# Patient Record
Sex: MALE | Race: WHITE | NOT HISPANIC OR LATINO | Employment: FULL TIME | URBAN - METROPOLITAN AREA
[De-identification: names, ages, dates, MRNs, and addresses within clinical notes are randomized per-mention and may not be internally consistent; named-entity substitution may affect disease eponyms.]

---

## 2019-11-08 ENCOUNTER — HOSPITAL ENCOUNTER (INPATIENT)
Facility: MEDICAL CENTER | Age: 39
LOS: 1 days | DRG: 897 | End: 2019-11-09
Attending: EMERGENCY MEDICINE | Admitting: INTERNAL MEDICINE

## 2019-11-08 DIAGNOSIS — F10.10 ALCOHOL ABUSE: ICD-10-CM

## 2019-11-08 DIAGNOSIS — R26.81 GAIT INSTABILITY: ICD-10-CM

## 2019-11-08 LAB
ALBUMIN SERPL BCP-MCNC: 4.4 G/DL (ref 3.2–4.9)
ALBUMIN/GLOB SERPL: 2.1 G/DL
ALP SERPL-CCNC: 89 U/L (ref 30–99)
ALT SERPL-CCNC: 14 U/L (ref 2–50)
AMPHET UR QL SCN: NEGATIVE
ANION GAP SERPL CALC-SCNC: 7 MMOL/L (ref 0–11.9)
APPEARANCE UR: CLEAR
AST SERPL-CCNC: 15 U/L (ref 12–45)
BARBITURATES UR QL SCN: NEGATIVE
BASOPHILS # BLD AUTO: 0.6 % (ref 0–1.8)
BASOPHILS # BLD: 0.05 K/UL (ref 0–0.12)
BENZODIAZ UR QL SCN: NEGATIVE
BILIRUB SERPL-MCNC: 0.4 MG/DL (ref 0.1–1.5)
BILIRUB UR QL STRIP.AUTO: NEGATIVE
BUN SERPL-MCNC: 10 MG/DL (ref 8–22)
BZE UR QL SCN: NEGATIVE
CALCIUM SERPL-MCNC: 9.5 MG/DL (ref 8.5–10.5)
CANNABINOIDS UR QL SCN: POSITIVE
CHLORIDE SERPL-SCNC: 109 MMOL/L (ref 96–112)
CO2 SERPL-SCNC: 23 MMOL/L (ref 20–33)
COLOR UR: YELLOW
CREAT SERPL-MCNC: 0.85 MG/DL (ref 0.5–1.4)
EOSINOPHIL # BLD AUTO: 0.2 K/UL (ref 0–0.51)
EOSINOPHIL NFR BLD: 2.3 % (ref 0–6.9)
ERYTHROCYTE [DISTWIDTH] IN BLOOD BY AUTOMATED COUNT: 43.1 FL (ref 35.9–50)
GLOBULIN SER CALC-MCNC: 2.1 G/DL (ref 1.9–3.5)
GLUCOSE SERPL-MCNC: 90 MG/DL (ref 65–99)
GLUCOSE UR STRIP.AUTO-MCNC: NEGATIVE MG/DL
HCT VFR BLD AUTO: 51.7 % (ref 42–52)
HGB BLD-MCNC: 17.1 G/DL (ref 14–18)
IMM GRANULOCYTES # BLD AUTO: 0.02 K/UL (ref 0–0.11)
IMM GRANULOCYTES NFR BLD AUTO: 0.2 % (ref 0–0.9)
INR PPP: 0.98 (ref 0.87–1.13)
KETONES UR STRIP.AUTO-MCNC: NEGATIVE MG/DL
LEUKOCYTE ESTERASE UR QL STRIP.AUTO: NEGATIVE
LYMPHOCYTES # BLD AUTO: 3.23 K/UL (ref 1–4.8)
LYMPHOCYTES NFR BLD: 36.5 % (ref 22–41)
MAGNESIUM SERPL-MCNC: 2.1 MG/DL (ref 1.5–2.5)
MCH RBC QN AUTO: 30.4 PG (ref 27–33)
MCHC RBC AUTO-ENTMCNC: 33.1 G/DL (ref 33.7–35.3)
MCV RBC AUTO: 91.8 FL (ref 81.4–97.8)
METHADONE UR QL SCN: NEGATIVE
MICRO URNS: NORMAL
MONOCYTES # BLD AUTO: 0.87 K/UL (ref 0–0.85)
MONOCYTES NFR BLD AUTO: 9.8 % (ref 0–13.4)
NEUTROPHILS # BLD AUTO: 4.48 K/UL (ref 1.82–7.42)
NEUTROPHILS NFR BLD: 50.6 % (ref 44–72)
NITRITE UR QL STRIP.AUTO: NEGATIVE
NRBC # BLD AUTO: 0 K/UL
NRBC BLD-RTO: 0 /100 WBC
OPIATES UR QL SCN: NEGATIVE
OXYCODONE UR QL SCN: NEGATIVE
PCP UR QL SCN: NEGATIVE
PH UR STRIP.AUTO: 5 [PH] (ref 5–8)
PLATELET # BLD AUTO: 185 K/UL (ref 164–446)
PMV BLD AUTO: 11.2 FL (ref 9–12.9)
POTASSIUM SERPL-SCNC: 3.6 MMOL/L (ref 3.6–5.5)
PROPOXYPH UR QL SCN: NEGATIVE
PROT SERPL-MCNC: 6.5 G/DL (ref 6–8.2)
PROT UR QL STRIP: NEGATIVE MG/DL
PROTHROMBIN TIME: 13.2 SEC (ref 12–14.6)
RBC # BLD AUTO: 5.63 M/UL (ref 4.7–6.1)
RBC UR QL AUTO: NEGATIVE
SODIUM SERPL-SCNC: 139 MMOL/L (ref 135–145)
SP GR UR STRIP.AUTO: 1.02
UROBILINOGEN UR STRIP.AUTO-MCNC: 0.2 MG/DL
WBC # BLD AUTO: 8.9 K/UL (ref 4.8–10.8)

## 2019-11-08 PROCEDURE — A9270 NON-COVERED ITEM OR SERVICE: HCPCS | Performed by: EMERGENCY MEDICINE

## 2019-11-08 PROCEDURE — 700101 HCHG RX REV CODE 250: Performed by: EMERGENCY MEDICINE

## 2019-11-08 PROCEDURE — 93005 ELECTROCARDIOGRAM TRACING: CPT | Performed by: EMERGENCY MEDICINE

## 2019-11-08 PROCEDURE — 36415 COLL VENOUS BLD VENIPUNCTURE: CPT

## 2019-11-08 PROCEDURE — 96365 THER/PROPH/DIAG IV INF INIT: CPT

## 2019-11-08 PROCEDURE — 96368 THER/DIAG CONCURRENT INF: CPT

## 2019-11-08 PROCEDURE — 700102 HCHG RX REV CODE 250 W/ 637 OVERRIDE(OP): Performed by: EMERGENCY MEDICINE

## 2019-11-08 PROCEDURE — 85025 COMPLETE CBC W/AUTO DIFF WBC: CPT

## 2019-11-08 PROCEDURE — 99285 EMERGENCY DEPT VISIT HI MDM: CPT

## 2019-11-08 PROCEDURE — 96366 THER/PROPH/DIAG IV INF ADDON: CPT

## 2019-11-08 PROCEDURE — 700105 HCHG RX REV CODE 258: Performed by: EMERGENCY MEDICINE

## 2019-11-08 PROCEDURE — 700111 HCHG RX REV CODE 636 W/ 250 OVERRIDE (IP): Performed by: EMERGENCY MEDICINE

## 2019-11-08 PROCEDURE — 83735 ASSAY OF MAGNESIUM: CPT

## 2019-11-08 PROCEDURE — 81003 URINALYSIS AUTO W/O SCOPE: CPT

## 2019-11-08 PROCEDURE — 770020 HCHG ROOM/CARE - TELE (206)

## 2019-11-08 PROCEDURE — 80053 COMPREHEN METABOLIC PANEL: CPT

## 2019-11-08 PROCEDURE — 85610 PROTHROMBIN TIME: CPT

## 2019-11-08 PROCEDURE — 80307 DRUG TEST PRSMV CHEM ANLYZR: CPT

## 2019-11-08 RX ORDER — LORAZEPAM 1 MG/1
1 TABLET ORAL EVERY 4 HOURS PRN
COMMUNITY

## 2019-11-08 RX ORDER — POLYETHYLENE GLYCOL 3350 17 G/17G
1 POWDER, FOR SOLUTION ORAL
Status: DISCONTINUED | OUTPATIENT
Start: 2019-11-08 | End: 2019-11-09

## 2019-11-08 RX ORDER — DIMENHYDRINATE 50 MG
50 TABLET ORAL EVERY 6 HOURS PRN
COMMUNITY

## 2019-11-08 RX ORDER — BISACODYL 10 MG
10 SUPPOSITORY, RECTAL RECTAL
Status: DISCONTINUED | OUTPATIENT
Start: 2019-11-08 | End: 2019-11-09

## 2019-11-08 RX ORDER — ACETAMINOPHEN 325 MG/1
650 TABLET ORAL EVERY 6 HOURS PRN
Status: DISCONTINUED | OUTPATIENT
Start: 2019-11-08 | End: 2019-11-09 | Stop reason: HOSPADM

## 2019-11-08 RX ORDER — AMOXICILLIN 250 MG
2 CAPSULE ORAL 2 TIMES DAILY
Status: DISCONTINUED | OUTPATIENT
Start: 2019-11-08 | End: 2019-11-09

## 2019-11-08 RX ADMIN — THIAMINE HYDROCHLORIDE 500 MG: 100 INJECTION, SOLUTION INTRAMUSCULAR; INTRAVENOUS at 21:53

## 2019-11-08 RX ADMIN — THIAMINE HYDROCHLORIDE 1000 ML: 100 INJECTION, SOLUTION INTRAMUSCULAR; INTRAVENOUS at 21:37

## 2019-11-08 RX ADMIN — THERA TABS 1 TABLET: TAB at 21:37

## 2019-11-08 ASSESSMENT — PATIENT HEALTH QUESTIONNAIRE - PHQ9
1. LITTLE INTEREST OR PLEASURE IN DOING THINGS: NOT AT ALL
2. FEELING DOWN, DEPRESSED, IRRITABLE, OR HOPELESS: NOT AT ALL
SUM OF ALL RESPONSES TO PHQ9 QUESTIONS 1 AND 2: 0

## 2019-11-09 ENCOUNTER — APPOINTMENT (OUTPATIENT)
Dept: RADIOLOGY | Facility: MEDICAL CENTER | Age: 39
DRG: 897 | End: 2019-11-09
Attending: STUDENT IN AN ORGANIZED HEALTH CARE EDUCATION/TRAINING PROGRAM

## 2019-11-09 VITALS
WEIGHT: 143.3 LBS | OXYGEN SATURATION: 93 % | RESPIRATION RATE: 16 BRPM | DIASTOLIC BLOOD PRESSURE: 82 MMHG | HEART RATE: 72 BPM | HEIGHT: 70 IN | BODY MASS INDEX: 20.52 KG/M2 | TEMPERATURE: 98.6 F | SYSTOLIC BLOOD PRESSURE: 121 MMHG

## 2019-11-09 PROBLEM — R26.81 GAIT INSTABILITY: Status: ACTIVE | Noted: 2019-11-09

## 2019-11-09 PROBLEM — F10.10 ALCOHOL ABUSE: Status: ACTIVE | Noted: 2019-11-09

## 2019-11-09 PROBLEM — E87.6 HYPOKALEMIA: Status: ACTIVE | Noted: 2019-11-09

## 2019-11-09 LAB
ALBUMIN SERPL BCP-MCNC: 4 G/DL (ref 3.2–4.9)
ALBUMIN/GLOB SERPL: 2.4 G/DL
ALP SERPL-CCNC: 71 U/L (ref 30–99)
ALT SERPL-CCNC: 10 U/L (ref 2–50)
ANION GAP SERPL CALC-SCNC: 7 MMOL/L (ref 0–11.9)
AST SERPL-CCNC: 12 U/L (ref 12–45)
BASOPHILS # BLD AUTO: 0.6 % (ref 0–1.8)
BASOPHILS # BLD: 0.05 K/UL (ref 0–0.12)
BILIRUB SERPL-MCNC: 0.3 MG/DL (ref 0.1–1.5)
BUN SERPL-MCNC: 8 MG/DL (ref 8–22)
CALCIUM SERPL-MCNC: 8.9 MG/DL (ref 8.5–10.5)
CHLORIDE SERPL-SCNC: 109 MMOL/L (ref 96–112)
CO2 SERPL-SCNC: 25 MMOL/L (ref 20–33)
CREAT SERPL-MCNC: 0.9 MG/DL (ref 0.5–1.4)
EOSINOPHIL # BLD AUTO: 0.23 K/UL (ref 0–0.51)
EOSINOPHIL NFR BLD: 2.9 % (ref 0–6.9)
ERYTHROCYTE [DISTWIDTH] IN BLOOD BY AUTOMATED COUNT: 43.7 FL (ref 35.9–50)
FOLATE SERPL-MCNC: >22.4 NG/ML
GLOBULIN SER CALC-MCNC: 1.7 G/DL (ref 1.9–3.5)
GLUCOSE SERPL-MCNC: 80 MG/DL (ref 65–99)
HCT VFR BLD AUTO: 47.6 % (ref 42–52)
HGB BLD-MCNC: 15.7 G/DL (ref 14–18)
HIV 1+2 AB+HIV1 P24 AG SERPL QL IA: NON REACTIVE
IMM GRANULOCYTES # BLD AUTO: 0.03 K/UL (ref 0–0.11)
IMM GRANULOCYTES NFR BLD AUTO: 0.4 % (ref 0–0.9)
LYMPHOCYTES # BLD AUTO: 3.13 K/UL (ref 1–4.8)
LYMPHOCYTES NFR BLD: 38.8 % (ref 22–41)
MCH RBC QN AUTO: 30.7 PG (ref 27–33)
MCHC RBC AUTO-ENTMCNC: 33 G/DL (ref 33.7–35.3)
MCV RBC AUTO: 93 FL (ref 81.4–97.8)
MONOCYTES # BLD AUTO: 0.85 K/UL (ref 0–0.85)
MONOCYTES NFR BLD AUTO: 10.5 % (ref 0–13.4)
NEUTROPHILS # BLD AUTO: 3.77 K/UL (ref 1.82–7.42)
NEUTROPHILS NFR BLD: 46.8 % (ref 44–72)
NRBC # BLD AUTO: 0 K/UL
NRBC BLD-RTO: 0 /100 WBC
PLATELET # BLD AUTO: 165 K/UL (ref 164–446)
PMV BLD AUTO: 11.2 FL (ref 9–12.9)
POTASSIUM SERPL-SCNC: 3.6 MMOL/L (ref 3.6–5.5)
PROT SERPL-MCNC: 5.7 G/DL (ref 6–8.2)
RBC # BLD AUTO: 5.12 M/UL (ref 4.7–6.1)
SODIUM SERPL-SCNC: 141 MMOL/L (ref 135–145)
T3FREE SERPL-MCNC: 2.93 PG/ML (ref 2.4–4.2)
T4 FREE SERPL-MCNC: 1.01 NG/DL (ref 0.53–1.43)
TREPONEMA PALLIDUM IGG+IGM AB [PRESENCE] IN SERUM OR PLASMA BY IMMUNOASSAY: NON REACTIVE
TSH SERPL DL<=0.005 MIU/L-ACNC: 5.69 UIU/ML (ref 0.38–5.33)
VIT B12 SERPL-MCNC: 517 PG/ML (ref 211–911)
WBC # BLD AUTO: 8.1 K/UL (ref 4.8–10.8)

## 2019-11-09 PROCEDURE — 97535 SELF CARE MNGMENT TRAINING: CPT

## 2019-11-09 PROCEDURE — 86780 TREPONEMA PALLIDUM: CPT

## 2019-11-09 PROCEDURE — 700102 HCHG RX REV CODE 250 W/ 637 OVERRIDE(OP): Performed by: STUDENT IN AN ORGANIZED HEALTH CARE EDUCATION/TRAINING PROGRAM

## 2019-11-09 PROCEDURE — A9270 NON-COVERED ITEM OR SERVICE: HCPCS | Performed by: STUDENT IN AN ORGANIZED HEALTH CARE EDUCATION/TRAINING PROGRAM

## 2019-11-09 PROCEDURE — 70450 CT HEAD/BRAIN W/O DYE: CPT

## 2019-11-09 PROCEDURE — 84481 FREE ASSAY (FT-3): CPT

## 2019-11-09 PROCEDURE — 87389 HIV-1 AG W/HIV-1&-2 AB AG IA: CPT

## 2019-11-09 PROCEDURE — 99223 1ST HOSP IP/OBS HIGH 75: CPT | Mod: GC | Performed by: INTERNAL MEDICINE

## 2019-11-09 PROCEDURE — 700105 HCHG RX REV CODE 258: Performed by: STUDENT IN AN ORGANIZED HEALTH CARE EDUCATION/TRAINING PROGRAM

## 2019-11-09 PROCEDURE — 82607 VITAMIN B-12: CPT

## 2019-11-09 PROCEDURE — 97162 PT EVAL MOD COMPLEX 30 MIN: CPT

## 2019-11-09 PROCEDURE — 36415 COLL VENOUS BLD VENIPUNCTURE: CPT

## 2019-11-09 PROCEDURE — 84439 ASSAY OF FREE THYROXINE: CPT

## 2019-11-09 PROCEDURE — 700111 HCHG RX REV CODE 636 W/ 250 OVERRIDE (IP): Performed by: STUDENT IN AN ORGANIZED HEALTH CARE EDUCATION/TRAINING PROGRAM

## 2019-11-09 PROCEDURE — 84425 ASSAY OF VITAMIN B-1: CPT

## 2019-11-09 PROCEDURE — 82746 ASSAY OF FOLIC ACID SERUM: CPT

## 2019-11-09 PROCEDURE — 97165 OT EVAL LOW COMPLEX 30 MIN: CPT

## 2019-11-09 PROCEDURE — 84443 ASSAY THYROID STIM HORMONE: CPT

## 2019-11-09 PROCEDURE — 85025 COMPLETE CBC W/AUTO DIFF WBC: CPT

## 2019-11-09 PROCEDURE — 80053 COMPREHEN METABOLIC PANEL: CPT

## 2019-11-09 RX ORDER — POLYETHYLENE GLYCOL 3350 17 G/17G
1 POWDER, FOR SOLUTION ORAL
Status: DISCONTINUED | OUTPATIENT
Start: 2019-11-09 | End: 2019-11-09 | Stop reason: HOSPADM

## 2019-11-09 RX ORDER — AMOXICILLIN 250 MG
2 CAPSULE ORAL 2 TIMES DAILY PRN
Status: DISCONTINUED | OUTPATIENT
Start: 2019-11-09 | End: 2019-11-09 | Stop reason: HOSPADM

## 2019-11-09 RX ORDER — M-VIT,TX,IRON,MINS/CALC/FOLIC 27MG-0.4MG
1 TABLET ORAL DAILY
Status: DISCONTINUED | OUTPATIENT
Start: 2019-11-09 | End: 2019-11-09 | Stop reason: HOSPADM

## 2019-11-09 RX ORDER — POTASSIUM CHLORIDE 20 MEQ/1
40 TABLET, EXTENDED RELEASE ORAL ONCE
Status: COMPLETED | OUTPATIENT
Start: 2019-11-09 | End: 2019-11-09

## 2019-11-09 RX ORDER — M-VIT,TX,IRON,MINS/CALC/FOLIC 27MG-0.4MG
1 TABLET ORAL DAILY
Qty: 30 TAB | Refills: 0 | Status: SHIPPED | OUTPATIENT
Start: 2019-11-10

## 2019-11-09 RX ORDER — BISACODYL 10 MG
10 SUPPOSITORY, RECTAL RECTAL
Status: DISCONTINUED | OUTPATIENT
Start: 2019-11-09 | End: 2019-11-09 | Stop reason: HOSPADM

## 2019-11-09 RX ORDER — LANOLIN ALCOHOL/MO/W.PET/CERES
100 CREAM (GRAM) TOPICAL DAILY
Qty: 30 TAB | Refills: 0 | Status: SHIPPED | OUTPATIENT
Start: 2019-11-09

## 2019-11-09 RX ADMIN — POTASSIUM CHLORIDE 40 MEQ: 20 TABLET, EXTENDED RELEASE ORAL at 01:45

## 2019-11-09 RX ADMIN — POTASSIUM CHLORIDE 40 MEQ: 1500 TABLET, EXTENDED RELEASE ORAL at 10:17

## 2019-11-09 RX ADMIN — MULTIPLE VITAMINS W/ MINERALS TAB 1 TABLET: TAB at 12:15

## 2019-11-09 RX ADMIN — THIAMINE HYDROCHLORIDE 500 MG: 100 INJECTION, SOLUTION INTRAMUSCULAR; INTRAVENOUS at 07:50

## 2019-11-09 ASSESSMENT — COGNITIVE AND FUNCTIONAL STATUS - GENERAL
SUGGESTED CMS G CODE MODIFIER DAILY ACTIVITY: CH
MOBILITY SCORE: 24
SUGGESTED CMS G CODE MODIFIER DAILY ACTIVITY: CH
DAILY ACTIVITIY SCORE: 24
DAILY ACTIVITIY SCORE: 24
SUGGESTED CMS G CODE MODIFIER MOBILITY: CH
SUGGESTED CMS G CODE MODIFIER MOBILITY: CH
MOBILITY SCORE: 24

## 2019-11-09 ASSESSMENT — LIFESTYLE VARIABLES
DOES PATIENT WANT TO STOP DRINKING: YES
DOES PATIENT WANT TO TALK TO SOMEONE ABOUT QUITTING: NO
EVER HAD A DRINK FIRST THING IN THE MORNING TO STEADY YOUR NERVES TO GET RID OF A HANGOVER: NO
HAVE PEOPLE ANNOYED YOU BY CRITICIZING YOUR DRINKING: NO
ON A TYPICAL DAY WHEN YOU DRINK ALCOHOL HOW MANY DRINKS DO YOU HAVE: 3
CONSUMPTION TOTAL: POSITIVE
HOW MANY TIMES IN THE PAST YEAR HAVE YOU HAD 5 OR MORE DRINKS IN A DAY: 0
EVER_SMOKED: YES
TOTAL SCORE: 1
ALCOHOL_USE: YES
TOTAL SCORE: 1
AVERAGE NUMBER OF DAYS PER WEEK YOU HAVE A DRINK CONTAINING ALCOHOL: 5
EVER FELT BAD OR GUILTY ABOUT YOUR DRINKING: NO
HAVE YOU EVER FELT YOU SHOULD CUT DOWN ON YOUR DRINKING: YES
TOTAL SCORE: 1

## 2019-11-09 ASSESSMENT — ENCOUNTER SYMPTOMS
CHILLS: 0
SEIZURES: 0
ORTHOPNEA: 0
FOCAL WEAKNESS: 0
COUGH: 0
LOSS OF CONSCIOUSNESS: 0
SENSORY CHANGE: 0
ABDOMINAL PAIN: 0
SPEECH CHANGE: 0
DOUBLE VISION: 0
NAUSEA: 0
TREMORS: 0
BLURRED VISION: 0
MYALGIAS: 0
VOMITING: 0
FEVER: 0
HEMOPTYSIS: 0
TINGLING: 0
WEAKNESS: 0
DIZZINESS: 1
HEARTBURN: 0
DIARRHEA: 0
HEADACHES: 0
PALPITATIONS: 0
CONSTIPATION: 0
WEIGHT LOSS: 0
SPUTUM PRODUCTION: 0

## 2019-11-09 ASSESSMENT — GAIT ASSESSMENTS
GAIT LEVEL OF ASSIST: SUPERVISED
DISTANCE (FEET): 500

## 2019-11-09 ASSESSMENT — ACTIVITIES OF DAILY LIVING (ADL): TOILETING: INDEPENDENT

## 2019-11-09 NOTE — NON-PROVIDER
Internal Medicine Medical Student Note  Note Author: Jair Gonzalez, Student    Name Tim Cutler     1980   Age/Sex 38 y.o. male   MRN [id number]   Code Status Full Code             Reason for interval visit  (Principal Problem)     Ataxia, unsteadiness possibly 2/2 to alcohol withdrawl       Interval Problem Daily Status Update  (problem status, last 24 hours, new history, new data )     Mr. Tim Mclain is a 38 year old male who was admitted for loss of balance, vs. Ataxia vs. Wernicke's. Patient has a history of alcohol abuse and states that he can drink a bottle of whiskey (1/5) in a couple days. Currently Patient denies tremors, delirium, and hallucinations. Patient states that his last drink was on , and states that he drank 5-6 shots of alcohol, though is uncertain of the amount. Patient attributes his ataxia/unsteadiness to a fall about 3 weeks ago where he had exhibited seizure like activity and hit head.     Patient was given thiamine overnight for suspicion of wernicke's and states that he does not feel unsteady on his feet or when he sits up. He feels much better this morning and had no acute events overnight.         Physical Exam       Vitals:    19 0508 19 0810 19 0811 19 0813   BP: 110/70 134/86 131/95 133/98   Pulse: 76 82 (!) 113 (!) 107   Resp: 16 15     Temp: 36.7 °C (98 °F) 37.2 °C (98.9 °F)     TempSrc: Temporal Temporal     SpO2: 94% 95% 95% 95%   Weight:       Height:         Body mass index is 20.56 kg/m². Weight: 65 kg (143 lb 4.8 oz)  Oxygen Therapy:  Pulse Oximetry: 95 %, O2 (LPM): 0, O2 Delivery: None (Room Air)    Physical Exam   Constitutional: He is oriented to person, place, and time and well-developed, well-nourished, and in no distress.   HENT:   Head: Normocephalic and atraumatic.   Neck: Normal range of motion. Neck supple.   Cardiovascular: Normal rate.   Distant heart sounds     Pulmonary/Chest: Effort normal and breath sounds  normal. No respiratory distress. He has no wheezes. He has no rales.   Abdominal: Soft. Bowel sounds are normal. He exhibits no distension. There is no tenderness. There is no rebound and no guarding.   Musculoskeletal: Normal range of motion.   Neurological: He is alert and oriented to person, place, and time.   Skin: Skin is warm and dry.     Recent Labs     11/08/19 2130 11/09/19  0233   WBC 8.9 8.1   RBC 5.63 5.12   HEMOGLOBIN 17.1 15.7   HEMATOCRIT 51.7 47.6   MCV 91.8 93.0   MCH 30.4 30.7   RDW 43.1 43.7   PLATELETCT 185 165   MPV 11.2 11.2   NEUTSPOLYS 50.60 46.80   LYMPHOCYTES 36.50 38.80   MONOCYTES 9.80 10.50   EOSINOPHILS 2.30 2.90   BASOPHILS 0.60 0.60     Recent Labs     11/08/19 2130 11/09/19  0233   SODIUM 139 141   POTASSIUM 3.6 3.6   CHLORIDE 109 109   CO2 23 25   GLUCOSE 90 80   BUN 10 8       CT-HEAD W/O    (Results Pending)           Assessment/Plan     Mr. Tim Mclain is a 38 year old male who was admitted for loss of balance, vs. Ataxia vs. Wernicke's.    #Gait instability possibly 2/2 to alcohol abuse    Patient's long standing history of alcohol abuse has me concerned that he may have developed wernicke's korsakoff. His history of ataxia may suggest this disorder. Patient states that his feelings of unsteadiness and ataxia has improved after the administration of thiamine which may indicate that patient was developing wernicke or placebo effect. Patient admits to drinking alcohol heavily, though has desires to quit.   Plan:   -patients TSH was mildly elevated, reflex T4 will be ordered   -B12 and folate were normal   -CT scan pending   -HIV, RPR will be ordered  -PT/OT may be consulted if patient's ataxia does not improve  -monitor alcohol withdrawals symptoms     #Hypokalemia    Patient had hypokalemia upon evaluation with CMP.   Plan:   -Replace potassium, monitor potassium with morning CMP goal >4

## 2019-11-09 NOTE — DISCHARGE INSTRUCTIONS
Discharge Instructions    Discharged to home by car with relative. Discharged via walking, hospital escort: Refused.  Special equipment needed: Not Applicable    Be sure to schedule a follow-up appointment with your primary care doctor or any specialists as instructed.     Discharge Plan:   Smoking Cessation Offered: Patient Refused  Influenza Vaccine Indication: Patient Refuses    I understand that a diet low in cholesterol, fat, and sodium is recommended for good health. Unless I have been given specific instructions below for another diet, I accept this instruction as my diet prescription.   Other diet: Regular    Special Instructions: None    · Is patient discharged on Warfarin / Coumadin?   No     Depression / Suicide Risk    As you are discharged from this [facility] facility, it is important to learn how to keep safe from harming yourself.    Recognize the warning signs:  · Abrupt changes in personality, positive or negative- including increase in energy   · Giving away possessions  · Change in eating patterns- significant weight changes-  positive or negative  · Change in sleeping patterns- unable to sleep or sleeping all the time   · Unwillingness or inability to communicate  · Depression  · Unusual sadness, discouragement and loneliness  · Talk of wanting to die  · Neglect of personal appearance   · Rebelliousness- reckless behavior  · Withdrawal from people/activities they love  · Confusion- inability to concentrate     If you or a loved one observes any of these behaviors or has concerns about self-harm, here's what you can do:  · Talk about it- your feelings and reasons for harming yourself  · Remove any means that you might use to hurt yourself (examples: pills, rope, extension cords, firearm)  · Get professional help from the community (Mental Health, Substance Abuse, psychological counseling)  · Do not be alone:Call your Safe Contact- someone whom you trust who will be there for you.  · Call your  local CRISIS HOTLINE 606-9223 or 320-175-1048  · Call your local Children's Mobile Crisis Response Team [place] (341) 915-1644 or www.MyPerfectGift.com  · Call the toll free National Suicide Prevention Hotlines   · National Suicide Prevention Lifeline 737-274-NJXJ (1609)  · National Hope Line Network 800-SUICIDE (920-1152)      Dizziness  Dizziness is a common problem. It makes you feel unsteady or lightheaded. You may feel like you are about to pass out (faint). Dizziness can lead to injury if you stumble or fall. Anyone can get dizzy, but dizziness is more common in older adults. This condition can be caused by a number of things, including:  · Medicines.  · Dehydration.  · Illness.  Follow these instructions at home:  Following these instructions may help with your condition:  Eating and drinking  · Drink enough fluid to keep your pee (urine) clear or pale yellow. This helps to keep you from getting dehydrated. Try to drink more clear fluids, such as water.  · Do not drink alcohol.  · Limit how much caffeine you drink or eat if told by your doctor.  · Limit how much salt you drink or eat if told by your doctor.  Activity  · Avoid making quick movements.  ¨ When you stand up from sitting in a chair, steady yourself until you feel okay.  ¨ In the morning, first sit up on the side of the bed. When you feel okay, stand slowly while you hold onto something. Do this until you know that your balance is fine.  · Move your legs often if you need to  one place for a long time. Tighten and relax your muscles in your legs while you are standing.  · Do not drive or use heavy machinery if you feel dizzy.  · Avoid bending down if you feel dizzy. Place items in your home so that they are easy for you to reach without leaning over.  Lifestyle  · Do not use any tobacco products, including cigarettes, chewing tobacco, or electronic cigarettes. If you need help quitting, ask your doctor.  · Try to lower your stress  level, such as with yoga or meditation. Talk with your doctor if you need help.  General instructions  · Watch your dizziness for any changes.  · Take medicines only as told by your doctor. Talk with your doctor if you think that your dizziness is caused by a medicine that you are taking.  · Tell a friend or a family member that you are feeling dizzy. If he or she notices any changes in your behavior, have this person call your doctor.  · Keep all follow-up visits as told by your doctor. This is important.  Contact a doctor if:  · Your dizziness does not go away.  · Your dizziness or light-headedness gets worse.  · You feel sick to your stomach (nauseous).  · You have trouble hearing.  · You have new symptoms.  · You are unsteady on your feet or you feel like the room is spinning.  Get help right away if:  · You throw up (vomit) or have diarrhea and are unable to eat or drink anything.  · You have trouble:  ¨ Talking.  ¨ Walking.  ¨ Swallowing.  ¨ Using your arms, hands, or legs.  · You feel generally weak.  · You are not thinking clearly or you have trouble forming sentences. It may take a friend or family member to notice this.  · You have:  ¨ Chest pain.  ¨ Pain in your belly (abdomen).  ¨ Shortness of breath.  ¨ Sweating.  · Your vision changes.  · You are bleeding.  · You have a headache.  · You have neck pain or a stiff neck.  · You have a fever.  This information is not intended to replace advice given to you by your health care provider. Make sure you discuss any questions you have with your health care provider.  Document Released: 12/06/2012 Document Revised: 05/25/2017 Document Reviewed: 12/14/2015  [employer] Interactive Patient Education © 2017 [employer]    Hypokalemia  Hypokalemia means a low potassium level in the blood. Symptoms may include muscle weakness and cramping, fatigue, abdominal pain, vomiting, constipation, or irregularities of the heartbeat. Sometimes hypokalemia is discovered by your  caregiver if you are taking certain medicines for high blood pressure or kidney disease.   Potassium is an electrolyte that helps regulate the amount of fluid in the body. It also stimulates muscle contraction and maintains a stable acid-base balance. If potassium levels go too low or too high, your health may be in danger. You are at risk for developing shock, heart, and lung problems. Hypokalemia can occur if you have excessive diarrhea, vomiting, or sweating. Potassium can be lost through your kidneys in the urine. Certain common medicines can also cause potassium loss, especially water pills (diuretics). The same is possible with cortisone medications or certain types of antibiotics. Low potassium can be dangerous if you are taking certain heart medicines. In diabetes, your potassium may fall after you take insulin, especially if your diabetes had been out of control for a while. In rare cases, potassium may be low because you are not getting enough in your diet.   In adults, a potassium level below 3.5 mEq/L is usually considered low.  Hypokalemia can be treated with potassium supplements taken by mouth and a diet that is high in potassium. Foods with high potassium content are:  · Peas, lentils, lima beans, nuts, and dried fruit.   · Whole grain and bran cereals and breads.   · Fresh fruit and vegetables. Examples include:   · Bananas.   · Cantaloupe.   · Grapefruit.   · Oranges.   · Tomatoes.   · Honeydew melons.   · Potatoes.   · Peaches.   · Orange and tomato juices.   · Meats.   See your caregiver as instructed for a follow-up blood test to be sure your potassium is back to normal.  SEEK MEDICAL CARE IF:   · You have nausea, vomiting, constipation, or abdominal pain.   · You have palpitations or irregular heartbeats, chest pain or shortness of breath.   · You have muscle cramps or weakness or fatigue.   · You have lethargy.   SEEK IMMEDIATE MEDICAL CARE IF:   · You have paralysis.   · You have confusion or  other mental status changes.   Document Released: 12/18/2006 Document Revised: 03/11/2013 Document Reviewed: 04/13/2011  [employer]Care® Patient Information ©2013 [employer].    Alcohol Abuse and Nutrition  Alcohol abuse is any pattern of alcohol consumption that harms your health, relationships, or work. Alcohol abuse can affect how your body breaks down and absorbs nutrients from food by causing your liver to work abnormally. Additionally, many people who abuse alcohol do not eat enough carbohydrates, protein, fat, vitamins, and minerals. This can cause poor nutrition (malnutrition) and a lack of nutrients (nutrient deficiencies), which can lead to further complications.  Nutrients that are commonly lacking (deficient) among people who abuse alcohol include:  · Vitamins.  ¨ Vitamin A. This is stored in your liver. It is important for your vision, metabolism, and ability to fight off infections (immunity).  ¨ B vitamins. These include vitamins such as folate, thiamin, and niacin. These are important in new cell growth and maintenance.  ¨ Vitamin C. This plays an important role in iron absorption, wound healing, and immunity.  ¨ Vitamin D. This is produced by your liver, but you can also get vitamin D from food. Vitamin D is necessary for your body to absorb and use calcium.  · Minerals.  ¨ Calcium. This is important for your bones and your heart and blood vessel (cardiovascular) function.  ¨ Iron. This is important for blood, muscle, and nervous system functioning.  ¨ Magnesium. This plays an important role in muscle and nerve function, and it helps to control blood sugar and blood pressure.  ¨ Zinc. This is important for the normal function of your nervous system and digestive system (gastrointestinal tract).  Nutrition is an essential component of therapy for alcohol abuse. Your health care provider or dietitian will work with you to design a plan that can help restore nutrients to your body and prevent potential  complications.  What is my plan?  Your dietitian may develop a specific diet plan that is based on your condition and any other complications you may have. A diet plan will commonly include:  · A balanced diet.  ¨ Grains: 6-8 oz per day.  ¨ Vegetables: 2-3 cups per day.  ¨ Fruits: 1-2 cups per day.  ¨ Meat and other protein: 5-6 oz per day.  ¨ Dairy: 2-3 cups per day.  · Vitamin and mineral supplements.  What do I need to know about alcohol and nutrition?  · Consume foods that are high in antioxidants, such as grapes, berries, nuts, green tea, and dark green and orange vegetables. This can help to counteract some of the stress that is placed on your liver by consuming alcohol.  · Avoid food and drinks that are high in fat and sugar. Foods such as sugared soft drinks, salty snack foods, and candy contain empty calories. This means that they lack important nutrients such as protein, fiber, and vitamins.  · Eat frequent meals and snacks. Try to eat 5-6 small meals each day.  · Eat a variety of fresh fruits and vegetables each day. This will help you get plenty of water, fiber, and vitamins in your diet.  · Drink plenty of water and other clear fluids. Try to drink at least 48-64 oz (1.5-2 L) of water per day.  · If you are a vegetarian, eat a variety of protein-rich foods. Pair whole grains with plant-based proteins at meals and snacks to obtain the greatest nutrient benefit from your food. For example, eat rice with beans, put peanut butter on whole-grain toast, or eat oatmeal with sunflower seeds.  · Soak beans and whole grains overnight before cooking. This can help your body to absorb the nutrients more easily.  · Include foods fortified with vitamins and minerals in your diet. Commonly fortified foods include milk, orange juice, cereal, and bread.  · If you are malnourished, your dietitian may recommend a high-protein, high-calorie diet. This may include:  ¨ 2,000-3,000 calories (kilocalories) per day.  ¨   grams of protein per day.  · Your health care provider may recommend a complete nutritional supplement beverage. This can help to restore calories, protein, and vitamins to your body. Depending on your condition, you may be advised to consume this instead of or in addition to meals.  · Limit your intake of caffeine. Replace drinks like coffee and black tea with decaffeinated coffee and herbal tea.  · Eat a variety of foods that are high in omega fatty acids. These include fish, nuts and seeds, and soybeans. These foods may help your liver to recover and may also stabilize your mood.  · Certain medicines may cause changes in your appetite, taste, and weight. Work with your health care provider and dietitian to make any adjustments to your medicines and diet plan.  · Include other healthy lifestyle choices in your daily routine.  ¨ Be physically active.  ¨ Get enough sleep.  ¨ Spend time doing activities that you enjoy.  · If you are unable to take in enough food and calories by mouth, your health care provider may recommend a feeding tube. This is a tube that passes through your nose and throat, directly into your stomach. Nutritional supplement beverages can be given to you through the feeding tube to help you get the nutrients you need.  · Take vitamin or mineral supplements as recommended by your health care provider.  What foods can I eat?  Grains   Enriched pasta. Enriched rice. Fortified whole-grain bread. Fortified whole-grain cereal. Barley. Brown rice. Quinoa. Millet.  Vegetables   All fresh, frozen, and canned vegetables. Spinach. Kale. Artichoke. Carrots. Winter squash and pumpkin. Sweet potatoes. Broccoli. Cabbage. Cucumbers. Tomatoes. Sweet peppers. Green beans. Peas. Corn.  Fruits   All fresh and frozen fruits. Berries. Grapes. [place]. Papaya. Guava. Cherries. Apples. Bananas. Peaches. Plums. Pineapple. Watermelon. Cantaloupe. Oranges. Avocado.  Meats and Other Protein Sources   Beef liver. Lean beef.  Pork. Fresh and canned chicken. Fresh fish. Oysters. Sardines. Canned tuna. Shrimp. Eggs with yolks. Nuts and seeds. Peanut butter. Beans and lentils. Soybeans. Tofu.  Dairy   Whole, low-fat, and nonfat milk. Whole, low-fat, and nonfat yogurt. Cottage cheese. Sour cream. Hard and soft cheeses.  Beverages   Water. Herbal tea. Decaffeinated coffee. Decaffeinated green tea. 100% fruit juice. 100% vegetable juice. Instant breakfast shakes.  Condiments   Ketchup. Mayonnaise. Mustard. Salad dressing. Barbecue sauce.  Sweets and Desserts   Sugar-free ice cream. Sugar-free pudding. Sugar-free gelatin.  Fats and Oils   Butter. Vegetable oil, flaxseed oil, olive oil, and walnut oil.  Other   Complete nutrition shakes. Protein bars. Sugar-free gum.  The items listed above may not be a complete list of recommended foods or beverages. Contact your dietitian for more options.   What foods are not recommended?  Grains   Sugar-sweetened breakfast cereals. Flavored instant oatmeal. Fried breads.  Vegetables   Breaded or deep-fried vegetables.  Fruits   Dried fruit with added sugar. Candied fruit. Canned fruit in syrup.  Meats and Other Protein Sources   Breaded or deep-fried meats.  Dairy   Flavored milks. Fried cheese curds or fried cheese sticks.  Beverages   Alcohol. Sugar-sweetened soft drinks. Sugar-sweetened tea. Caffeinated coffee and tea.  Condiments   Sugar. Honey. Agave nectar. Molasses.  Sweets and Desserts   Chocolate. Cake. Cookies. Candy.  Other   Potato chips. Pretzels. Salted nuts. Candied nuts.  The items listed above may not be a complete list of foods and beverages to avoid. Contact your dietitian for more information.   This information is not intended to replace advice given to you by your health care provider. Make sure you discuss any questions you have with your health care provider.  Document Released: 10/12/2006 Document Revised: 04/26/2017 Document Reviewed: 07/21/2015  [employer] Patient  Education © 2017 [employer].    Cyanocobalamin, Pyridoxine, and Folate  What is this medicine?  A multivitamin containing folic acid, vitamin B6, and vitamin B12.  This medicine may be used for other purposes; ask your health care provider or pharmacist if you have questions.  COMMON BRAND NAME(S): AllanFol RX, AllanTex, Av-Connor FB, ComBgen, FaBB, Folamin, Folastin, Folbalin, Folbee, Folbic, Folcaps, Folgard, Folgard RX, Folgard RX 2.2, Folplex, Folplex 2.2, Foltabs 800, Foltx, Homocysteine Formula, Niva-Fol, NuFol, TL Irma RX, Virt-Irma, Virt-Connor, Virt-Connor Forte, Catherine-Respa  What should I tell my health care provider before I take this medicine?  They need to know if you have any of these conditions:  -bleeding or clotting disorder  -history of anemia of any type  -other chronic health condition  -an unusual or allergic reaction to vitamins, other medicines, foods, dyes, or preservatives  -pregnant or trying to get pregnant  -breast-feeding  How should I use this medicine?  Take by mouth with a glass of water. May take with food. Follow the directions on the prescription label. It is usually given once a day. Do not take your medicine more often than directed.  Contact your pediatrician regarding the use of this medicine in children. Special care may be needed.  Overdosage: If you think you have taken too much of this medicine contact a poison control center or emergency room at once.  NOTE: This medicine is only for you. Do not share this medicine with others.  What if I miss a dose?  If you miss a dose, take it as soon as you can. If it is almost time for your next dose, take only that dose. Do not take double or extra doses.  What may interact with this medicine?  -levodopa  This list may not describe all possible interactions. Give your health care provider a list of all the medicines, herbs, non-prescription drugs, or dietary supplements you use. Also tell them if you smoke, drink alcohol, or use illegal  drugs. Some items may interact with your medicine.  What should I watch for while using this medicine?  See your health care professional for regular checks on your progress. Remember that vitamin supplements do not replace the need for good nutrition from a balanced diet.  What side effects may I notice from receiving this medicine?  Side effects that you should report to your doctor or health care professional as soon as possible:  -allergic reaction such as skin rash or difficulty breathing  -vomiting  Side effects that usually do not require medical attention (report to your doctor or health care professional if they continue or are bothersome):  -nausea  -stomach upset  This list may not describe all possible side effects. Call your doctor for medical advice about side effects. You may report side effects to FDA at 5-482-FDA-6620.  Where should I keep my medicine?  Keep out of the reach of children.  Most vitamins should be stored at controlled room temperature. Check your specific product directions. Protect from heat and moisture. Throw away any unused medicine after the expiration date.  NOTE: This sheet is a summary. It may not cover all possible information. If you have questions about this medicine, talk to your doctor, pharmacist, or health care provider.  © 2018 [employer] Standard (2009-02-07 00:59:55)    Multivitamin with Minerals and Iron formulations (oral solid dosage forms)  What is this medicine?  MULTIVITAMIN with MINERAL and IRON combinations are used to help provide good nutrition.  This medicine may be used for other purposes; ask your health care provider or pharmacist if you have questions.  COMMON BRAND NAME(S): Active FE, Androvite, Bacmin, Caromega, Centrum, Centrum Specialist Energy, Centrum Specialist Heart, Centrum Specialist Vision, CertaVite Senior with Antioxidant Nutrients, Daily Multiple, Daily Multiple Vitamins with Minerals, Daily Connor plus Iron, Ferrocite Plus, Generix-T,  Geritol, Geritol Multivitamin, Integra Plus, Megavite Multivitamin, Multilex, Multilex T&M, Multivitamin With Minerals, Niva-Plus, Nu-Iron V, One-Daily, Optivite PMT, PNV Prenatal Plus Multivitamin, Prenatal Plus, Prenatal Plus Low Iron, PreNatal Vitamins Plus, PrePLUS, Strovite Forte, Super Theravite-M, Thera Advanced, Thera-M, Thera-Tab M High Potency, Therapeutic-M, Therems, Therems-H, Therems-M, Uni-Thera M Advanced, Unicap M, Unicap SR, Unicomplex-M, Vitafol, Vol-Plus  What should I tell my health care provider before I take this medicine?  They need to know if you have any of these conditions:  -bleeding or clotting disorder  -history of anemia of any type  -other chronic health condition  -an unusual or allergic reaction to vitamins, minerals, other medicines, foods, dyes, or preservatives  -pregnant or trying to get pregnant  -breast-feeding  How should I use this medicine?  Take by mouth with a glass of water. May take with food. Some brands, but not all, may be chewed before swallowing. Follow the directions on the prescription or product label. The usual dose is one tablet once a day. Do not take your medicine more often than directed.  Contact your pediatrician regarding the use of this medicine in children. Special care may be needed.  Overdosage: If you think you have taken too much of this medicine contact a poison control center or emergency room at once.  NOTE: This medicine is only for you. Do not share this medicine with others.  What if I miss a dose?  If you miss a dose, take it as soon as you can. If it is almost time for your next dose, take only that dose. Do not take double or extra doses.  What may interact with this medicine?  -antacids  -cefdinir  -cefditoren  -etidronate  -fluoroquinolone antibiotics (examples: ciprofloxacin, gatifloxacin, levofloxacin)  -levodopa  -tetracycline antibiotics (examples: doxycycline, minocycline, tetracycline)  -thyroid hormones  -warfarin  This list may  not describe all possible interactions. Give your health care provider a list of all the medicines, herbs, non-prescription drugs, or dietary supplements you use. Also tell them if you smoke, drink alcohol, or use illegal drugs. Some items may interact with your medicine.  What should I watch for while using this medicine?  Get regular checks on your progress. Remember that vitamin and mineral supplements do not replace the need for good nutrition from a balanced diet. Talk with your health care professional if you have questions or need advice.  What side effects may I notice from receiving this medicine?  Side effects that you should report to your doctor or health care professional as soon as possible:  -allergic reaction such as skin rash or difficulty breathing  -vomiting  Side effects that usually do not require medical attention (report to your doctor or health care professional if they continue or are bothersome):  -nausea  -stomach upset  This list may not describe all possible side effects. Call your doctor for medical advice about side effects. You may report side effects to FDA at 2-818-FDA-2140.  Where should I keep my medicine?  Keep out of the reach of children.  Most vitamins and minerals should be stored at controlled room temperature between 15 and 30 degrees C (59 and 86 degrees F). Check your specific product directions. Protect from heat and moisture. Throw away any unused medicine after the expiration date.  NOTE: This sheet is a summary. It may not cover all possible information. If you have questions about this medicine, talk to your doctor, pharmacist, or health care provider.  © 2018 [employer] (2016-07-14 08:55:11)    Thiamine, Vitamin B1 tablets  What is this medicine?  THIAMINE (THAHY uh min) is a vitamin B1. It is added to a healthy diet to prevent or to treat low vitamin B1 levels.  This vitamin may be used for other purposes; ask your health care provider or pharmacist if  you have questions.  This medicine may be used for other purposes; ask your health care provider or pharmacist if you have questions.  What should I tell my health care provider before I take this medicine?  They need to know if you have any of the following conditions:  -Wernicke's disease  -an unusual or allergic reaction to B vitamins, other medicines, foods, dyes, or preservatives  -pregnant or trying to get pregnant  -breast-feeding  How should I use this medicine?  Take this medicine by mouth with a glass of water. Follow the directions on the package or prescription label. For best results take this medicine with food. Take your medicine at regular intervals. Do not take your medicine more often than directed.  Talk to your pediatrician regarding the use of this medicine in children. While this medicine may be prescribed for selected conditions, precautions do apply.  Overdosage: If you think you have taken too much of this medicine contact a poison control center or emergency room at once.  NOTE: This medicine is only for you. Do not share this medicine with others.  What if I miss a dose?  If you miss a dose, take it as soon as you can. If it is almost time for your next dose, take only that dose. Do not take double or extra doses.  What may interact with this medicine?  Interactions are not expected.  This list may not describe all possible interactions. Give your health care provider a list of all the medicines, herbs, non-prescription drugs, or dietary supplements you use. Also tell them if you smoke, drink alcohol, or use illegal drugs. Some items may interact with your medicine.  What should I watch for while using this medicine?  Follow a healthy diet. Taking a vitamin supplement does not replace the need for a balanced diet. Some foods that have this vitamin naturally are yeast, beans, peas, nuts, pork, and beef. Limit alcohol, smoking and stress.  Too much of this vitamin can be unsafe. Talk to your  doctor or health care provider about how much is right for you.  What side effects may I notice from receiving this medicine?  Side effects that you should report to your doctor or health care professional as soon as possible:  -allergic reactions like skin rash, itching or hives, swelling of the face, lips, or tongue  -chest tightness  -fast, irregular heartbeat  -irritable, restless  -nausea, vomiting  -sweating  -unusually bleeding or bruising  Side effects that usually do not require medical attention (report to your doctor or health care professional if they continue or are bothersome):  -sneezing  This list may not describe all possible side effects. Call your doctor for medical advice about side effects. You may report side effects to FDA at 9-972-FDA-8326.  Where should I keep my medicine?  Keep out of the reach of children.  Store at room temperature between 15 and 30 degrees C (59 and 85 degrees F). Protect from heat and light. Throw away any unused medicine after the expiration date.  NOTE: This sheet is a summary. It may not cover all possible information. If you have questions about this medicine, talk to your doctor, pharmacist, or health care provider.  © 2018 [employer]vier/Gold Standard (2009-09-03 12:50:29)

## 2019-11-09 NOTE — ASSESSMENT & PLAN NOTE
Patient has history of alcohol abuse, per patient his last drink was on 31 October and he is trying to quit history   He  denies previous hospitalization for alcohol withdrawal, denies tremors, hallucinations, delirium tremens  Patient states that he might had one episode of seizure in the past secondary to alcohol but is not sure about it.  Received rally pack in the ED, vitals are stable, electrolytes are within normal limits.  Not On CIWA protocol as his last drink was 8 days ago    Admit to telemetry  Continuous pulse ox  Monitor for alcohol withdrawal

## 2019-11-09 NOTE — THERAPY
"Occupational Therapy Evaluation completed.   Functional Status:  Independent for ADLs and ADL transfers  Plan of Care: Patient with no further skilled OT needs in the acute care setting at this time  Discharge Recommendations:  Equipment: No Equipment Needed. Post-acute therapy Anticipate that the patient will have no further occupational therapy needs after discharge from the hospital.     See \"Rehab Therapy-Acute\" Patient Summary Report for complete documentation.    Pt is 37yo male with pmhx of EtOH abuse, reports quit drinking 9 days ago. Pt had two falls with head strikes recently in the setting of EtOH, has since stopped drinking but c/o intermittent impaired balance without clear cause of symptoms. Pt with good insight to transient balance issues and appropriate responses indicative of good safety awareness. Pt presents to OT eval without functional self-care deficit, demonstrated basic ADLs and reports he manages all IADLs independently. No additional acute OT needs at this time.   "

## 2019-11-09 NOTE — ED TRIAGE NOTES
"Amb to triage w/ c/o loss of balance resulting in 2 falls in the last 4 months.  Pt reports he had a seizure during each incident.  Pt was seen at [facility] after first fall and [facility] after 2nd, had head CT's each time which were negative.  Was told symptoms were related to etoh or etoh detox.  Pt admits to etoh abuse, states last drink 1 wk ago.  Last fall 3 wks ago. Pt reports \"I just feel like crap\".   "

## 2019-11-09 NOTE — ED PROVIDER NOTES
"ED Provider Note    Scribed for Dexter Cunningham M.D. by Sonu Pinedo. 11/8/2019  9:09 PM    Primary care provider: No primary care provider on file.  Means of arrival: Walk in  History obtained from: Patient  History limited by: None    CHIEF COMPLAINT  Chief Complaint   Patient presents with   • T-5000 GLF     in 4 month   • Loss Of Balance   • Seizure       HPI  Tim Evans Cutler is a 38 y.o. male who presents to the Emergency Department for loss of balance. The patient states that it feels like he is \"going to tip over\". He has to 2 seizures due to alcohol withdrawal, the most recent one being 3 weeks ago. He is currently experiences head pressure, but denies any cough, cold, runny nose, hearing changes or ear pain. His last drink was October 31st. He is unsure about how much he would drink daily, but states that it was \"a lot\". The patient denies any recreation drugs use. The patient states that he tried to take motion sickness medications without alleviation. The patient does not state any exacerbating factors.     REVIEW OF SYSTEMS  Pertinent positives include: loss of balance and head pressure. Pertinent negatives include: cough, cold, runny nose, hearing changes or ear pain. See history of present illness. All other systems are negative.     PAST MEDICAL HISTORY  Alcoholism    SURGICAL HISTORY  patient denies any surgical history    SOCIAL HISTORY  Social History     Tobacco Use   • Smoking status: Current Every Day Smoker     Packs/day: 1.00     Types: Cigarettes   Substance Use Topics   • Alcohol use: Yes     Comment: daily etoh, 4-5 shots, last drink 1 week ago   • Drug use: Yes     Comment: thc      Social History     Substance and Sexual Activity   Drug Use Yes    Comment: thc       FAMILY HISTORY  History reviewed. No pertinent family history.    CURRENT MEDICATIONS  Home Medications     Reviewed by Kathy Conroy (Pharmacy Tech) on 11/08/19 at 2319  Med List Status: Complete   Medication " "Last Dose Status   dimenhyDRINATE (DRAMAMINE) 50 MG Tab 11/8/2019 Active   LORazepam (ATIVAN) 1 MG Tab <week Active                ALLERGIES  No Known Allergies    PHYSICAL EXAM  VITAL SIGNS: /87   Pulse 77   Temp 36.9 °C (98.4 °F) (Temporal)   Resp 15   Ht 1.778 m (5' 10\")   Wt 65.8 kg (145 lb 1 oz)   SpO2 94%   BMI 20.81 kg/m²     Constitutional: Alert in no apparent distress.  HENT: Dry mucus membranes. No signs of trauma, Bilateral external ears normal, Nose normal. Uvula midline.   Eyes: Pupils are equal and reactive, Conjunctiva normal, Non-icteric.   Neck: Normal range of motion, No tenderness, Supple, No stridor.   Lymphatic: No lymphadenopathy noted.   Cardiovascular: Regular rate and rhythm, no murmurs.   Thorax & Lungs: Normal breath sounds, No respiratory distress, No wheezing, No chest tenderness.   Abdomen:  Soft, No tenderness, No peritoneal signs, No masses, No pulsatile masses.   Skin: Warm, Dry, No erythema, No rash.   Back: No bony tenderness, No CVA tenderness.   Extremities: Intact distal pulses, No edema, No tenderness, No cyanosis.  Musculoskeletal: Good range of motion in all major joints. No tenderness to palpation or major deformities noted.   Neurologic: Alert , Normal motor function, Normal sensory function, No focal deficits noted.   Psychiatric: Affect normal, Judgment normal, Mood normal.     DIAGNOSTIC STUDIES / PROCEDURES    LABS  Labs Reviewed   CBC WITH DIFFERENTIAL - Abnormal; Notable for the following components:       Result Value    MCHC 33.1 (*)     Monos (Absolute) 0.87 (*)     All other components within normal limits    Narrative:     Indicate which anticoagulants the patient is on:->NONE   URINE DRUG SCREEN - Abnormal; Notable for the following components:    Cannabinoid Metab Positive (*)     All other components within normal limits   COMP METABOLIC PANEL    Narrative:     Indicate which anticoagulants the patient is on:->NONE   PROTHROMBIN TIME    " Narrative:     Indicate which anticoagulants the patient is on:->NONE   URINALYSIS,CULTURE IF INDICATED   ESTIMATED GFR    Narrative:     Indicate which anticoagulants the patient is on:->NONE   MAGNESIUM   CBC WITH DIFFERENTIAL   COMP METABOLIC PANEL   TSH   VITAMIN B12   FOLATE      All labs reviewed by me.    RADIOLOGY  CT-HEAD W/O    (Results Pending)     The radiologist's interpretation of all radiological studies have been reviewed by me.    COURSE & MEDICAL DECISION MAKING  Nursing notes, VS, PMSFHx reviewed in chart.    38 y.o. male p/w chief complaint of loss of balance.    9:09 PM Patient seen and examined at bedside. I informed that patient that he will likely be admitted for further evaluation. Patient verbally understands and agrees to plan of care.    The differential diagnoses include but are not limited to:   History and physical exam concerning for Warnicke Korsakoff syndrome given the fact that patient is reported to drink excessively to the point where he had two alcohol withdrawal seizures recently.    Patient given 500 mg of thiamine in the emergency department via IV by me    I believe patient to have a broad differential and can not exclude the following at this time.  If no significant improvement after thiamine would consider MRI brain to rule out potential posterior CVA  Patient symptoms also may be explained by postconcussive instability  HINTS test unremarkable at this time.   NIH 0.     Patient not actively in withdrawal at this time  No significant electrolyte abnormalities    12:04 AM I discussed the patient's case and the above findings with Dr. Molina (hospiatlist) who agrees to admit the patient.      HYDRATION: Based on the patient's presentation of Dehydration the patient was given IV fluids. IV Hydration was used because oral hydration was not adequate alone. Upon recheck following hydration, the patient was .     DISPOSITION:  Patient will be admitted to [facility]  condition.     FINAL IMPRESSION  1. Alcohol abuse    2. Gait instability          Sonu CRUZ (Scribe), am scribing for, and in the presence of, Dexter Cunningham M.D..    Electronically signed by: Sonu Pinedo (Scribe), 11/8/2019    Dexter CRUZ M.D. personally performed the services described in this documentation, as scribed by Sonu Pinedo in my presence, and it is both accurate and complete.  C  The note accurately reflects work and decisions made by me.  Dexter Cunningham  11/9/2019  1:35 AM

## 2019-11-09 NOTE — PROGRESS NOTES
Pt preferred prescriptions be sent to [facility] on [address]. Discharge paperwork complete and pharmacy changed in [facility]. Call out to provider to re-route medication to correct pharmacy.

## 2019-11-09 NOTE — ED NOTES
Pt provided urinal to urinate in and informed a sample will be obtained. Pt verbalizes understanding.

## 2019-11-09 NOTE — PROGRESS NOTES
2 RN Skin Check    2 RN skin check complete.   Devices in place: no  Skin assessed under devices: n/a  Confirmed pressure ulcers found on: n/a.  New potential pressure ulcers noted on n/a. Wound consult placed No.  The following interventions in place Pillows and Lotion.    Bilat ears-dry and intact  bilat elbows-dry and intact  bilat heels-dry and intact  Coccyx-dry and intact

## 2019-11-09 NOTE — CARE PLAN
Problem: Communication  Goal: The ability to communicate needs accurately and effectively will improve  Outcome: PROGRESSING AS EXPECTED  Discussed plan of care.  Pt communicates effectively.     Problem: Safety  Goal: Will remain free from injury  Outcome: PROGRESSING AS EXPECTED   Pt understands the importance of calling for assistance with ambulation. Call light within reach. Bed locked and in lowest position.

## 2019-11-09 NOTE — THERAPY
"Physical Therapy Evaluation completed.   Bed Mobility:  Supine to Sit: Independent  Transfers: Sit to Stand: Independent  Gait: Level Of Assist: Supervised with No Equipment Needed       Plan of Care: Will benefit from Physical Therapy 2 times per week  Discharge Recommendations: Equipment: Will Continue to Assess for Equipment Needs    Pt presents with impaired acitivty tolerance associated with c/c of disequilibrium. 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 Will follow if remains in house for full vestibular eval next session but pt demonstrated functional mobility required to return home when medically appropriate to do so.     See \"Rehab Therapy-Acute\" Patient Summary Report for complete documentation.     "

## 2019-11-09 NOTE — ED NOTES
Med Rec Updated and Complete per Pt at bedside  Allergies Reviewed  No PO ABX last 14 days.    Pt denies maintenance medication this time.

## 2019-11-09 NOTE — PROGRESS NOTES
Pt stable. Denies dizziness. Ambulating with staff, no gait disturbance. Plan for CT today. Will continue to monitor.

## 2019-11-09 NOTE — ASSESSMENT & PLAN NOTE
Patient presents with 3 weeks history of gait instability  Patient he had an episode of syncope 3 weeks ago when he hit his head, he was brought to the hospital where CT scan was done which was negative.  Patient states that he has been having gait instability since then  Patient is alert and oriented x4, has no nystagmus, he has normal gait making Warnicke encephalopathy less likely  Patient denies tinnitus, decreased hearing, orthostatic hypotension.  Vitals are normal, not hypoglycemic.  This could be secondary to post concussion syndrome which is a common sequelae of traumatic brain injury that includes dizziness and gait instability      Admit to medical floor  Ordered B12, folate, TSH  Ordered CT scan to rule out any intracranial bleeding  Fall precaution  Continue to monitor

## 2019-11-09 NOTE — SENIOR ADMIT NOTE
"SENIOR ADMIT NOTE:    Pablo Molina  Date & Time note created:    11/9/2019   1:47 AM       Chief Complaint:  Gait instability    History of Present Illness:    The patient is a 38 year old male with a history of alcohol use disorder who presented to the ED with a 3 week history of gait issues. The patient states that he has hit his head twice secondary to alcohol use. The most recent time was 3 weeks ago and since then he has been having this feeling that he is going to all over. He has also had some lightheadedness and dizziness. He has not fallen. The patient denies any current drinking stating that he quit on October 31. The patient states he used to drink a bottle of Percy Paz per day (stated it was not the biggest bottle, but the size below that, likely 750mL)    The patient tried taking antihistamines for motion sickness to help, but states that it has not helped. He also stated that the instability is the worst when he is out in the sun. He states that at night time it is much better. The patient states that it was not getting better so he came into the ED.      Physical Exam:  Weight/BMI: Body mass index is 20.56 kg/m².  /83   Pulse 81   Temp 36.8 °C (98.3 °F) (Temporal)   Resp 17   Ht 1.778 m (5' 10\")   Wt 65 kg (143 lb 4.8 oz)   SpO2 96%   Vitals:    11/08/19 2202 11/08/19 2232 11/08/19 2302 11/08/19 2335   BP: 109/69 125/97 108/79 127/83   Pulse: 74 81 86 81   Resp: 15 14 14 17   Temp:    36.8 °C (98.3 °F)   TempSrc:    Temporal   SpO2: 95% 97% 95% 96%   Weight:    65 kg (143 lb 4.8 oz)   Height:         Oxygen Therapy:  Pulse Oximetry: 96 %, O2 (LPM): 0, O2 Delivery: None (Room Air)    Physical exam:  Constitutional:  No acute distress. A&O x3  HENMT:  Normocephalic, Atraumatic  Eyes:  PERRLA, EOMI, Conjunctiva normal  Neck:  Supple, Full range of motion, No stridor  Cardiovascular:  Regular rate and rhythm, No murmurs, No rubs, No gallops.   Lungs: Respiratory effort is normal, no " crackles, no wheezing.  Extremities: Good pedal pulses b/l, no edema, 5/5 strength.  Abdomen: Bowel sounds x4, Soft, Non-tender, Non-distended, No guarding, No rebound, No masses.  Neurologic: Good sensations, Cranial nerves II through XII grossly intact. No focal deficits noted. Gait is normal with no focal issues.     Imaging  CT-HEAD W/O    (Results Pending)       ASSESSMENT/PLAN:  # Gait instability  # Alcohol use disorder  # Alcohol related seizures  - Unclear what is causing the patient's gait instability. He has tried Meclizine with no relief in terms of vertigo, but denies any vertigo symptoms. Symptoms improve in the darkness, however. He denies a room spinning sensation. He did have a head trauma that preceded this and this may be secondary to a post-concussive syndrome. Given the patient's alcohol use, this could be part of Wernicke's vs B12/folate deficiency.   - Check vitamin B12, folate  - IV thiamine given in the ED for presumed Wenicke's   - CT head to rule out any structural abnormality  - Can consider EEG in the morning if this is thought to be secondary to underlying seizure disorder.     For full details please refer to H&P done by Dr. Kevon Molina M.D.

## 2019-11-09 NOTE — CARE PLAN
Problem: Communication  Goal: The ability to communicate needs accurately and effectively will improve  Outcome: PROGRESSING AS EXPECTED  Pt whiteboard updated on plan of care  Pt encouraged to call for assistance  Pt encouraged to voice any concerns or questions regarding care plan  Pt updated on plan of care as it develops and changes       Problem: Safety  Goal: Will remain free from injury  Outcome: PROGRESSING AS EXPECTED  Treaded socks in use  Call light within reach and patient calls appropriately  Medication education provided prior to administration  Medications administered as ordered  Bed alarm on and bed locked in lowest position

## 2019-11-09 NOTE — PROGRESS NOTES
Spoke with provider who will send prescriptions to correct pharmacy and states it is ok to DC now.     Pt dc'd at 1432. IV and monitor removed; monitor room notified. Pt left unit via walking with friend. Personal belongings with pt when leaving unit. Pt given discharge instructions prior to leaving unit including prescription and when to visit with physician; verbalizes understanding. Copy of discharge instructions with pt and in the chart.

## 2019-11-09 NOTE — H&P
Internal Medicine Admitting History and Physical    Note Author: Heri Lund M.D.       Name iTm Cutler     1980   Age/Sex 38 y.o. male   MRN [id number]   Code Status full     After 5PM or if no immediate response to page, please call for cross-coverage  Attending/Team: Dr Pak/Abelardo See Patient List for primary contact information  Call [phone] to page    1st Call - Day Intern (R1):   Dr Daigle 2nd Call - Day Sr. Resident (R2/R3):   Dr Estevez       Chief Complaint:   Loss of balance    HPI:  Mr. Dumont is a 58-year-old male with no significant past medical history presented to the hospital for evaluation of loss of balance.  Patient states that since the past 3 weeks it feels like he is going to tip over but today it got  worsen which alarmed him to come to the emergency department.  Patient has history of alcohol abuse per patient he has been drinking since teenage.  He drinks 1 bottle of whiskey every 1 to 2 days.  There are certain days where he is alcohol free for more than a week.  His last drink was on .  Patient denies any tremors, hallucinations or history of delirium tremens after quitting alcohol.  Patient states that about 3 weeks ago he passed out and hit his head, he was brought to the hospital where CT scan was done which was negative, he was told that he had seizure due to alcohol withdrawal.  He contributes his symptoms to the fall that he experienced 3 weeks ago because symptoms started after that.  Few years ago he had similar episode of syncope but the doctors told him that was secondary to dehydration.  He is alert and oriented x4, he has no nystagmus and has normal gait.  He denies tinnitus, change in hearing any preceding viral illness.  In the ED his vitals are pulse 86, RR 14, blood pressure 108/79, he saturating in 90s on room air.  His WBC 8.9, hemoglobin 17.1, INR 0.98, sodium 139, potassium 3.6, glucose 90, creatinine 0.85 EKG shows sinus rhythm,  urine drug test positive for cannabinoids, urinalysis negative.    Review of Systems   Constitutional: Negative for chills, fever and weight loss.   HENT: Negative for ear pain, hearing loss and tinnitus.    Eyes: Negative for blurred vision and double vision.   Respiratory: Negative for cough, hemoptysis and sputum production.    Cardiovascular: Negative for chest pain, palpitations and orthopnea.   Gastrointestinal: Negative for abdominal pain, constipation, diarrhea, heartburn, nausea and vomiting.   Genitourinary: Negative for dysuria, frequency and urgency.   Musculoskeletal: Negative for myalgias.   Skin: Negative for rash.   Neurological: Positive for dizziness. Negative for tingling, tremors, sensory change, speech change, focal weakness, seizures, loss of consciousness, weakness and headaches.             Past Medical History (Chronic medical problem, known complications and current treatment)    History reviewed. No pertinent past medical history.       Past Surgical History:  History reviewed. No pertinent surgical history.    Current Outpatient Medications:  Home Medications     Reviewed by Kathy Conroy (Pharmacy Tech) on 11/08/19 at 2319  Med List Status: Complete   Medication Last Dose Status   dimenhyDRINATE (DRAMAMINE) 50 MG Tab 11/8/2019 Active   LORazepam (ATIVAN) 1 MG Tab <week Active                Medication Allergy/Sensitivities:  No Known Allergies      Family History (mandatory)   History reviewed. No pertinent family history.    Social History (mandatory)   Tobacco 1 ppd  x 20 years  Smokes weed   Social History     Socioeconomic History   • Marital status: Single     Spouse name: Not on file   • Number of children: Not on file   • Years of education: Not on file   • Highest education level: Not on file   Occupational History   • Not on file   Social Needs   • Financial resource strain: Not on file   • Food insecurity:     Worry: Not on file     Inability: Not on file   •  Transportation needs:     Medical: Not on file     Non-medical: Not on file   Tobacco Use   • Smoking status: Current Every Day Smoker     Packs/day: 1.00     Types: Cigarettes   Substance and Sexual Activity   • Alcohol use: Yes     Comment: daily etoh, 4-5 shots, last drink 1 week ago   • Drug use: Yes     Comment: thc   • Sexual activity: Not on file   Lifestyle   • Physical activity:     Days per week: Not on file     Minutes per session: Not on file   • Stress: Not on file   Relationships   • Social connections:     Talks on phone: Not on file     Gets together: Not on file     Attends Buddhist service: Not on file     Active member of club or organization: Not on file     Attends meetings of clubs or organizations: Not on file     Relationship status: Not on file   • Intimate partner violence:     Fear of current or ex partner: Not on file     Emotionally abused: Not on file     Physically abused: Not on file     Forced sexual activity: Not on file   Other Topics Concern   • Not on file   Social History Narrative   • Not on file     Living situation: lives alone   PCP : No primary care provider on file.    Physical Exam     Vitals:    11/08/19 2202 11/08/19 2232 11/08/19 2302 11/08/19 2335   BP: 109/69 125/97 108/79 127/83   Pulse: 74 81 86 81   Resp: 15 14 14 17   Temp:    36.8 °C (98.3 °F)   TempSrc:    Temporal   SpO2: 95% 97% 95% 96%   Weight:    65 kg (143 lb 4.8 oz)   Height:         Body mass index is 20.56 kg/m².  O2 therapy: Pulse Oximetry: 96 %, O2 (LPM): 0, O2 Delivery: None (Room Air)    Physical Exam   Constitutional: He is oriented to person, place, and time and well-developed, well-nourished, and in no distress.   HENT:   Head: Normocephalic and atraumatic.   Mallampati score 2   Eyes: Pupils are equal, round, and reactive to light. EOM are normal. Right eye exhibits no discharge.   Neck: Normal range of motion. Neck supple. No thyromegaly present.   Cardiovascular: Normal rate, regular rhythm,  normal heart sounds and intact distal pulses. Exam reveals no gallop and no friction rub.   No murmur heard.  Pulmonary/Chest: Effort normal and breath sounds normal. No respiratory distress. He has no wheezes. He has no rales.   Abdominal: Soft. Bowel sounds are normal. He exhibits no distension. There is no tenderness. There is no rebound.   Musculoskeletal: Normal range of motion.         General: No deformity or edema.   Neurological: He is alert and oriented to person, place, and time. No cranial nerve deficit. Gait normal. Coordination normal.   No nystagmus   Normal gait    Skin: Skin is warm and dry. No erythema.         Data Review       Old Records Request:   Completed  Current Records review/summary: Completed    Lab Data Review:  Recent Results (from the past 24 hour(s))   CBC WITH DIFFERENTIAL    Collection Time: 11/08/19  9:30 PM   Result Value Ref Range    WBC 8.9 4.8 - 10.8 K/uL    RBC 5.63 4.70 - 6.10 M/uL    Hemoglobin 17.1 14.0 - 18.0 g/dL    Hematocrit 51.7 42.0 - 52.0 %    MCV 91.8 81.4 - 97.8 fL    MCH 30.4 27.0 - 33.0 pg    MCHC 33.1 (L) 33.7 - 35.3 g/dL    RDW 43.1 35.9 - 50.0 fL    Platelet Count 185 164 - 446 K/uL    MPV 11.2 9.0 - 12.9 fL    Neutrophils-Polys 50.60 44.00 - 72.00 %    Lymphocytes 36.50 22.00 - 41.00 %    Monocytes 9.80 0.00 - 13.40 %    Eosinophils 2.30 0.00 - 6.90 %    Basophils 0.60 0.00 - 1.80 %    Immature Granulocytes 0.20 0.00 - 0.90 %    Nucleated RBC 0.00 /100 WBC    Neutrophils (Absolute) 4.48 1.82 - 7.42 K/uL    Lymphs (Absolute) 3.23 1.00 - 4.80 K/uL    Monos (Absolute) 0.87 (H) 0.00 - 0.85 K/uL    Eos (Absolute) 0.20 0.00 - 0.51 K/uL    Baso (Absolute) 0.05 0.00 - 0.12 K/uL    Immature Granulocytes (abs) 0.02 0.00 - 0.11 K/uL    NRBC (Absolute) 0.00 K/uL   CMP    Collection Time: 11/08/19  9:30 PM   Result Value Ref Range    Sodium 139 135 - 145 mmol/L    Potassium 3.6 3.6 - 5.5 mmol/L    Chloride 109 96 - 112 mmol/L    Co2 23 20 - 33 mmol/L    Anion Gap 7.0 0.0  - 11.9    Glucose 90 65 - 99 mg/dL    Bun 10 8 - 22 mg/dL    Creatinine 0.85 0.50 - 1.40 mg/dL    Calcium 9.5 8.5 - 10.5 mg/dL    AST(SGOT) 15 12 - 45 U/L    ALT(SGPT) 14 2 - 50 U/L    Alkaline Phosphatase 89 30 - 99 U/L    Total Bilirubin 0.4 0.1 - 1.5 mg/dL    Albumin 4.4 3.2 - 4.9 g/dL    Total Protein 6.5 6.0 - 8.2 g/dL    Globulin 2.1 1.9 - 3.5 g/dL    A-G Ratio 2.1 g/dL   PT/INR    Collection Time: 19  9:30 PM   Result Value Ref Range    PT 13.2 12.0 - 14.6 sec    INR 0.98 0.87 - 1.13   ESTIMATED GFR    Collection Time: 19  9:30 PM   Result Value Ref Range    GFR If African American >60 >60 mL/min/1.73 m 2    GFR If Non African American >60 >60 mL/min/1.73 m 2   Magnesium    Collection Time: 19  9:30 PM   Result Value Ref Range    Magnesium 2.1 1.5 - 2.5 mg/dL   EKG    Collection Time: 19  9:46 PM   Result Value Ref Range    Report       [facility] Emergency Dept.    Test Date:  2019  Pt Name:    BRODERICK PEREZ                Department: ER  MRN:        [id number]                      Room:       [facility]  Gender:     Male                         Technician: [id number]  :        1980                   Requested By:ABEL HAWLEY  Order #:    [id number]                    Reading MD:    Measurements  Intervals                                Axis  Rate:       64                           P:          70  AR:         188                          QRS:        79  QRSD:       90                           T:          64  QT:         400  QTc:        413    Interpretive Statements  SINUS RHYTHM  No previous ECG available for comparison     URINALYSIS,CULTURE IF INDICATED    Collection Time: 19 10:39 PM   Result Value Ref Range    Color Yellow     Character Clear     Specific Gravity 1.025 <1.035    Ph 5.0 5.0 - 8.0    Glucose Negative Negative mg/dL    Ketones Negative Negative mg/dL    Protein Negative Negative mg/dL    Bilirubin Negative Negative    Urobilinogen,  Urine 0.2 Negative    Nitrite Negative Negative    Leukocyte Esterase Negative Negative    Occult Blood Negative Negative    Micro Urine Req see below    URINE DRUG SCREEN    Collection Time: 11/08/19 10:39 PM   Result Value Ref Range    Amphetamines Urine Negative Negative    Barbiturates Negative Negative    Benzodiazepines Negative Negative    Cocaine Metabolite Negative Negative    Methadone Negative Negative    Opiates Negative Negative    Oxycodone Negative Negative    Phencyclidine -Pcp Negative Negative    Propoxyphene Negative Negative    Cannabinoid Metab Positive (A) Negative       Imaging/Procedures Review:    Independant Imaging Review: Completed  CT-HEAD W/O    (Results Pending)            EKG:   EKG Independent Review: Completed  QTc:413, HR: 64, Normal Sinus Rhythm, no ST/T changes     Records reviewed and summarized in current documentation :  Yes  [employer] teaching service handout given to patient:  Yes         Assessment/Plan     Gait instability  Assessment & Plan  Patient presents with 3 weeks history of gait instability  Patient he had an episode of syncope 3 weeks ago when he hit his head, he was brought to the hospital where CT scan was done which was negative.  Patient states that he has been having gait instability since then  Patient is alert and oriented x4, has no nystagmus, he has normal gait making Warnicke encephalopathy less likely  Patient denies tinnitus, decreased hearing, orthostatic hypotension.  Vitals are normal, not hypoglycemic.  This could be secondary to post concussion syndrome which is a common sequelae of traumatic brain injury that includes dizziness and gait instability      Admit to medical floor  Ordered B12, folate, TSH  Ordered CT scan to rule out any intracranial bleeding  Fall precaution  Continue to monitor    Hypokalemia  Assessment & Plan  Patient potassium on admission is 3.6  Replaced   Continue to monitor    Alcohol abuse  Assessment & Plan  Patient has history  of alcohol abuse, per patient his last drink was on 31 October and he is trying to quit history   He  denies previous hospitalization for alcohol withdrawal, denies tremors, hallucinations, delirium tremens  Patient states that he might had one episode of seizure in the past secondary to alcohol but is not sure about it.  Received rally pack in the ED, vitals are stable, electrolytes are within normal limits.  Not On CIWA protocol as his last drink was 8 days ago    Admit to telemetry  Continuous pulse ox  Monitor for alcohol withdrawal      Anticipated Hospital stay: Observation admit        Quality Measures  Quality-Core Measures   Reviewed items::  Labs reviewed and EKG reviewed  Blackburn catheter::  No Blackburn  DVT prophylaxis pharmacological::  Enoxaparin (Lovenox)    PCP: No primary care provider on file.

## 2019-11-10 LAB — EKG IMPRESSION: NORMAL

## 2019-11-16 LAB — VIT B1 BLD-MCNC: 422 NMOL/L (ref 70–180)

## 2025-06-10 NOTE — PROGRESS NOTES
Received report from ED RNManisha. Assumed care of pt. Pt reports 0/10 pain at this time. Updated pt on plan of care. Pt resting comfortably in bed. bed alarm on. Educated on use of call light. Hourly rounding and continuous monitoring in place.    used